# Patient Record
Sex: MALE | Race: BLACK OR AFRICAN AMERICAN | Employment: FULL TIME | URBAN - METROPOLITAN AREA
[De-identification: names, ages, dates, MRNs, and addresses within clinical notes are randomized per-mention and may not be internally consistent; named-entity substitution may affect disease eponyms.]

---

## 2024-01-18 ENCOUNTER — APPOINTMENT (OUTPATIENT)
Dept: CT IMAGING | Facility: HOSPITAL | Age: 54
End: 2024-01-18
Attending: EMERGENCY MEDICINE
Payer: OTHER MISCELLANEOUS

## 2024-01-18 ENCOUNTER — HOSPITAL ENCOUNTER (EMERGENCY)
Facility: HOSPITAL | Age: 54
Discharge: HOME | End: 2024-01-18
Payer: OTHER MISCELLANEOUS

## 2024-01-18 VITALS
DIASTOLIC BLOOD PRESSURE: 101 MMHG | WEIGHT: 280 LBS | RESPIRATION RATE: 16 BRPM | TEMPERATURE: 99 F | HEART RATE: 64 BPM | OXYGEN SATURATION: 94 % | SYSTOLIC BLOOD PRESSURE: 146 MMHG | BODY MASS INDEX: 41.47 KG/M2 | HEIGHT: 69 IN

## 2024-01-18 DIAGNOSIS — S09.90XA INJURY OF HEAD, INITIAL ENCOUNTER: Primary | ICD-10-CM

## 2024-01-18 PROCEDURE — 70450 CT HEAD/BRAIN W/O DYE: CPT | Performed by: EMERGENCY MEDICINE

## 2024-01-18 PROCEDURE — 99284 EMERGENCY DEPT VISIT MOD MDM: CPT

## 2024-01-18 PROCEDURE — 99283 EMERGENCY DEPT VISIT LOW MDM: CPT

## 2024-01-18 RX ORDER — LOSARTAN POTASSIUM AND HYDROCHLOROTHIAZIDE 25; 100 MG/1; MG/1
1 TABLET ORAL DAILY
COMMUNITY

## 2024-01-18 RX ORDER — ACETAMINOPHEN 500 MG
1000 TABLET ORAL ONCE
Status: COMPLETED | OUTPATIENT
Start: 2024-01-18 | End: 2024-01-18

## 2024-01-18 NOTE — ED PROVIDER NOTES
Patient Seen in: [facility] Emergency Department      History     Chief Complaint   Patient presents with    Headache     Stated Complaint: Imaging    Subjective:   Patient presents to the emergency room for evaluation s/p head injury.  Patient states that he was at work today when an object hit him in the right side of his head.  Patient denies LOC.  States that he was wearing a work helmet at the time of the injury.  Patient denies change in vision or blurred vision.  States that he did have mild dizziness after that has resolved.  Patient states he is now having a right-sided headache.  Was seen at a walk-in clinic and told to come to the emergency room for a CT scan of his brain.  Patient denies nausea or vomiting.  Denies ocular pain or pressure.  Denies change in vision or blurred vision.  Patient denies neck pain.  He has full painless range of motion of his neck.  Denies any other injury or trauma.    The history is provided by the patient.           Objective:   Past Medical History:   Diagnosis Date    Essential hypertension     Hyperlipidemia               History reviewed. No pertinent surgical history.             Social History     Socioeconomic History    Marital status: Single   Tobacco Use    Smoking status: Never    Smokeless tobacco: Never   Vaping Use    Vaping Use: Never used   Substance and Sexual Activity    Alcohol use: Yes     Comment: occasional    Drug use: Yes     Types: Cannabis              Review of Systems    Positive for stated complaint: Imaging  Other systems are as noted in HPI.  Constitutional and vital signs reviewed.      All other systems reviewed and negative except as noted above.    Physical Exam     ED Triage Vitals [01/18/24 1252]   BP (!) 145/99   Pulse 79   Resp 18   Temp 99.3 °F (37.4 °C)   Temp src Temporal   SpO2 97 %   O2 Device None (Room air)       Current:BP (!) 145/99   Pulse 79   Temp 99.3 °F (37.4 °C) (Temporal)   Resp 18   Ht 175.3 cm (5' 9\")    Wt 127 kg   SpO2 97%   BMI 41.35 kg/m²         Physical Exam  Vitals and nursing note reviewed.   Constitutional:       Appearance: He is well-developed and overweight.   HENT:      Head: Normocephalic and atraumatic.      Right Ear: External ear normal.      Left Ear: External ear normal.      Nose: Nose normal.   Eyes:      Conjunctiva/sclera: Conjunctivae normal.      Pupils: Pupils are equal, round, and reactive to light.   Cardiovascular:      Rate and Rhythm: Normal rate and regular rhythm.      Heart sounds: Normal heart sounds.   Pulmonary:      Effort: Pulmonary effort is normal. No respiratory distress.      Breath sounds: Normal breath sounds. No stridor. No wheezing, rhonchi or rales.   Chest:      Chest wall: No mass, lacerations, deformity, swelling, tenderness or crepitus.   Abdominal:      General: Bowel sounds are normal.      Palpations: Abdomen is soft.      Tenderness: There is no abdominal tenderness. There is no right CVA tenderness, left CVA tenderness, guarding or rebound.   Musculoskeletal:         General: Normal range of motion.      Cervical back: Normal, normal range of motion and neck supple. No swelling, edema, deformity, erythema, signs of trauma, lacerations, rigidity, spasms, torticollis, tenderness, bony tenderness or crepitus. No pain with movement, spinous process tenderness or muscular tenderness. Normal range of motion.      Thoracic back: Normal. No swelling, edema, deformity, signs of trauma, lacerations, spasms, tenderness or bony tenderness. Normal range of motion. No scoliosis.      Lumbar back: Normal. No swelling, edema, deformity, signs of trauma, lacerations, spasms, tenderness or bony tenderness. Normal range of motion. No scoliosis.   Skin:     General: Skin is warm and dry.   Neurological:      Mental Status: He is alert and oriented to person, place, and time.      Sensory: Sensation is intact.      Motor: Motor function is intact.      Coordination: Coordination  is intact.      Gait: Gait is intact.      Deep Tendon Reflexes: Reflexes are normal and symmetric.   Psychiatric:         Behavior: Behavior normal.         Thought Content: Thought content normal.         Judgment: Judgment normal.               ED Course   Labs Reviewed - No data to display     CT BRAIN OR HEAD (90177) (Final result)  Result time 01/18/24 14:52:41  Final result by Kb Dawkins MD (01/18/24 14:52:41)                Impression:    CONCLUSION: No acute intracranial process.           Dictated by (CST): Kb Dawkins MD on 1/18/2024 at 2:51 PM      Finalized by (CST): Kb Dawkins MD on 1/18/2024 at 2:52 PM                  Narrative:    PROCEDURE: CT BRAIN OR HEAD (CPT=70450)     COMPARISON: None.     INDICATIONS: headache, light sensitivity post object falling on head     TECHNIQUE: CT images were obtained without contrast material.  Automated exposure control for dose reduction was used.  Dose information is transmitted to the ACR (American College of Radiology) NRDR (National Radiology Data Registry) which includes the  Dose Index Registry.     FINDINGS:  CSF SPACES: Ventricles, cisterns, and sulci are appropriate for age.  No hydrocephalus, subarachnoid hemorrhage, or mass.  No midline shift.    CEREBRUM: No edema, hemorrhage, mass, acute infarction, or significant atrophy.    WHITE MATTER: Normal white matter.    CEREBELLUM: No edema, hemorrhage, mass, acute infarction, or significant atrophy.    BRAINSTEM: No edema, hemorrhage, mass, acute infarction, or significant atrophy.    CALVARIUM: No apparent fracture, mass, or other significant visible lesion.    SINUSES: Mucous retention cyst versus polyp in the right maxillary sinus.  Mild chronic inflammation within the ethmoid labyrinth.  Mild chronic inflammation within the maxillary sinuses.  ORBITS: Limited views are unremarkable.       OTHER: Negative.                            MDM                                         Medical Decision  Making  Multiple medical diagnoses were considered. Patient is afebrile, nontoxic appearing, and in no acute distress.  Vital signs are stable.  Patient was given Tylenol.  CT SCAN of the brain is unremarkable.  Patient advised on ibuprofen and Tylenol over-the-counter for headaches.  Rest advised.  Advised on no strenuous activities or heavy lifting.  No contact sports.  He was given a copy of his CT scan results.  Advised to return if he develops dizziness, change in vision or blurred vision, worsening headache, or he has any new or worsening symptoms.  Strict return precautions were given.  Patient advised to follow-up with his primary care doctor in 2-3 days.  Patient verbalizes understanding of discharge instructions and plan of care.  Agrees with plan of care at this time.  Advised to return for any new or worsening symptoms.  Follow-up instructions given.        Amount and/or Complexity of Data Reviewed  Radiology: ordered. Decision-making details documented in ED Course.        Disposition and Plan     Clinical Impression:  1. Injury of head, initial encounter         Disposition:  Discharge  1/18/2024  3:00 pm    Follow-up:  Elana Wesley MD  [address] [place] [address]  [id number]  [facility] [address]  [phone]    Follow up in 2 day(s)            Medications Prescribed:  Current Discharge Medication List          This note was made using voice dictation and may include inadvertent errors due to the dictation software.  Please contact for clarification regarding any no discrepancies.    Employed shared decision making with the patient all questions answered.  The patient and/or family was counseled on the preliminary diagnosis, treatment, prescription medications especially for any sedating medications if applicable and instructed on concerning signs and symptoms and when to return to the ED for further evaluation.  Involved parties verbalized her understanding of the discharge instructions given.